# Patient Record
Sex: MALE | Race: WHITE
[De-identification: names, ages, dates, MRNs, and addresses within clinical notes are randomized per-mention and may not be internally consistent; named-entity substitution may affect disease eponyms.]

---

## 2018-09-19 ENCOUNTER — HOSPITAL ENCOUNTER (OUTPATIENT)
Dept: HOSPITAL 43 - DL.SDS | Age: 78
Discharge: HOME | End: 2018-09-19
Attending: OPHTHALMOLOGY
Payer: MEDICARE

## 2018-09-19 VITALS — DIASTOLIC BLOOD PRESSURE: 74 MMHG | SYSTOLIC BLOOD PRESSURE: 125 MMHG

## 2018-09-19 DIAGNOSIS — N52.9: ICD-10-CM

## 2018-09-19 DIAGNOSIS — Z87.891: ICD-10-CM

## 2018-09-19 DIAGNOSIS — M19.90: ICD-10-CM

## 2018-09-19 DIAGNOSIS — N40.0: ICD-10-CM

## 2018-09-19 DIAGNOSIS — N39.0: ICD-10-CM

## 2018-09-19 DIAGNOSIS — Z79.899: ICD-10-CM

## 2018-09-19 DIAGNOSIS — B96.20: ICD-10-CM

## 2018-09-19 DIAGNOSIS — H26.9: Primary | ICD-10-CM

## 2018-09-19 PROCEDURE — 66984 XCAPSL CTRC RMVL W/O ECP: CPT

## 2018-09-19 PROCEDURE — 00142 ANES PX ON EYE LENS SURGERY: CPT

## 2018-09-19 PROCEDURE — V2787 ASTIGMATISM-CORRECT FUNCTION: HCPCS

## 2018-09-19 NOTE — OR
DATE:  09/19/2018

 

PREOPERATIVE DIAGNOSIS:  Visually significant mixed cataract, left eye.

 

POSTOPERATIVE DIAGNOSIS:  Visually significant mixed cataract, left eye.

 

PROCEDURE:  Extracapsular cataract extraction with intraocular lens implant,

left eye.

 

ANESTHESIA:  Topical/local MAC.

 

COMPLICATIONS:  None.

 

INDICATION:  Mr. Hanson was seen in the clinic.  His examination revealed

visually significant cataract.  I explained options, offered cataract surgery;

and I explained risks including the potential for infection, retinal detachment,

and loss of vision amongst others.  We discussed implant options.  He has

requested a toric implant.  He understands that he may still require glasses for

some activities.  He voiced an understanding with respect to risks and

limitations and wished to proceed.

 

OPERATIVE DESCRIPTION:  After informed consent was obtained and the risks,

benefits, and alternatives were explained, the patient was brought to the

operative suite and topical anesthesia was administered.  The patient was then

prepped and draped in the sterile fashion, and attention was placed on the left

eye.  A sterile lid speculum was placed into the left eye to allow operative

exposure.  A full-thickness paracentesis was made in the temporal portion of the

operative eye.  Preservative-free lidocaine 0.1 mL was injected into the

anterior chamber followed by viscoelastic.  A full-thickness corneal incision

was then made into the anterior chamber.  A bent needle cystotome was used to

create a small nick in the anterior capsule.  The capsulorrhexis forceps was

then used to create a 360-degree curvilinear capsulorrhexis.  The nucleus was

then removed using a phacoemulsification handpiece, and the remaining cortical

material was then removed with irrigation and aspiration handpiece.  Following

removal of the cortical material, the capsular bag was then inspected and noted

to be free of any holes or tears.  Viscoelastic was then injected into the

capsular bag, and the intraocular lens was inserted into the capsular bag.

Implant was oriented to correspond with preoperative corneal marks made with the

patient in the upright position.  The viscoelastic material was then removed

from both the anterior and posterior chambers and from behind the IOL.  The lens

and capsular bag were then reinspected.  The IOL was well centered and the

capsular bag intact.  The wound and paracentesis sites were inspected and

hydrated with balanced saline solution.  Both were found to be self-sealing.

The intraocular pressure was assessed digitally and found to be within normal

range.  A good red reflex was noted at the completion of the procedure.  No

complications occurred during the operation.  At the completion of the

procedure, Maxitrol, Voltaren, and Iopidine drops were placed into the operative

eye.  A sterile eye shield was placed over the operative eye, and the patient

was transported to the postoperative recovery area having tolerated the

procedure well.  Postoperative instructions were given along with a

postoperative appointment.  The patient was advised to call with any questions

or concerns.

 

DD:  09/19/2018 08:48:21

DT:  09/19/2018 11:00:10

[facility]

Job #:  [id number]

## 2018-09-26 ENCOUNTER — HOSPITAL ENCOUNTER (OUTPATIENT)
Dept: HOSPITAL 43 - DL.SDS | Age: 78
Discharge: HOME | End: 2018-09-26
Attending: OPHTHALMOLOGY
Payer: MEDICARE

## 2018-09-26 VITALS — DIASTOLIC BLOOD PRESSURE: 68 MMHG | SYSTOLIC BLOOD PRESSURE: 136 MMHG

## 2018-09-26 DIAGNOSIS — E78.5: ICD-10-CM

## 2018-09-26 DIAGNOSIS — Z98.42: ICD-10-CM

## 2018-09-26 DIAGNOSIS — Z87.891: ICD-10-CM

## 2018-09-26 DIAGNOSIS — N52.9: ICD-10-CM

## 2018-09-26 DIAGNOSIS — Z79.899: ICD-10-CM

## 2018-09-26 DIAGNOSIS — Z96.1: ICD-10-CM

## 2018-09-26 DIAGNOSIS — M19.90: ICD-10-CM

## 2018-09-26 DIAGNOSIS — H25.811: Primary | ICD-10-CM

## 2018-09-26 DIAGNOSIS — N40.0: ICD-10-CM

## 2018-09-26 PROCEDURE — V2787 ASTIGMATISM-CORRECT FUNCTION: HCPCS

## 2018-09-26 NOTE — OR
DATE:  09/26/2018

 

PREOPERATIVE DIAGNOSIS:  Visually significant mixed cataract, right eye.

 

POSTOPERATIVE DIAGNOSIS:  Visually significant mixed cataract, right eye.

 

PROCEDURE:  Extracapsular cataract extraction with intraocular lens implant,

right eye.

 

ANESTHESIA:  Topical/local MAC.

 

COMPLICATIONS:  None.

 

INDICATION:  The patient was seen in the clinic.  His examination revealed

visually significant cataract and astigmatism.  I explained options, offered

cataract surgery, and I explained risks.  He is symptomatic and requested

surgery to improve vision and function.  He has difficulty reading fine print.

Slit lamp examination reveals mixed nuclear and cortical cataract.  We discussed

implant options.  He requested a Toric implant.  He has voiced an understanding

with respect to surgical risks including the potential for vision loss.

 

OPERATIVE DESCRIPTION:  After informed consent was obtained and the risks,

benefits, and alternatives were explained, the patient was brought to the

operative suite and topical anesthesia was administered.  The patient was then

prepped and draped in the sterile fashion and attention was placed on the right

eye.  A sterile lid speculum was placed into the right eye to allow operative

exposure. A full-thickness paracentesis was made in the temporal portion of the

operative eye. Preservative-free lidocaine 0.1 mL was injected into the anterior

chamber followed by viscoelastic. A full-thickness corneal incision was then

made into the anterior chamber.  A bent needle cystotome was used to create a

small nick in the anterior capsule. The capsulorrhexis forceps was then used to

create a 360-degree curvilinear capsulorrhexis.  The nucleus was then removed

using a phacoemulsification handpiece and the remaining cortical material was

then removed with irrigation and aspiration handpiece. Following removal of the

cortical material, the capsular bag was then inspected and noted to be free of

any holes or tears. Viscoelastic was then injected into the capsular bag and the

intraocular lens was inserted into the capsular bag.  Implant was oriented to

correspond with preoperative corneal marks made with the patient in the upright

position.  The viscoelastic material was then removed from both the anterior and

posterior chambers and from behind the IOL. The lens and capsular bag were then

reinspected. The IOL was well centered and the capsular bag intact. The wound

and paracentesis sites were inspected and hydrated with balanced saline

solution. Both were found to be self-sealing. The intraocular pressure was

assessed digitally and found to be within normal range. A good red reflex was

noted at the completion of the procedure. No complications occurred during the

operation. At the completion of the procedure, Maxitrol, Voltaren, and Iopidine

drops were placed into the operative eye. A sterile eye shield was placed over

the operative eye and the patient was transported to the postoperative recovery

area having tolerated the procedure well. Postoperative instructions were given

along with a postoperative appointment.  The patient was advised to call with

any questions or concerns.

 

DD:  09/26/2018 08:34:48

DT:  09/26/2018 09:11:23

[facility]

Job #:  [id number]